# Patient Record
Sex: FEMALE | Race: WHITE | Employment: STUDENT | URBAN - METROPOLITAN AREA
[De-identification: names, ages, dates, MRNs, and addresses within clinical notes are randomized per-mention and may not be internally consistent; named-entity substitution may affect disease eponyms.]

---

## 2021-10-14 ENCOUNTER — VIRTUAL VISIT (OUTPATIENT)
Dept: FAMILY MEDICINE CLINIC | Age: 21
End: 2021-10-14
Payer: COMMERCIAL

## 2021-10-14 ENCOUNTER — HOSPITAL ENCOUNTER (OUTPATIENT)
Age: 21
Setting detail: SPECIMEN
Discharge: HOME | End: 2021-10-14
Payer: COMMERCIAL

## 2021-10-14 DIAGNOSIS — Z20.822 EXPOSURE TO COVID-19 VIRUS: Primary | ICD-10-CM

## 2021-10-14 DIAGNOSIS — Z20.822 EXPOSURE TO COVID-19 VIRUS: ICD-10-CM

## 2021-10-14 PROCEDURE — U0003 INFECTIOUS AGENT DETECTION BY NUCLEIC ACID (DNA OR RNA); SEVERE ACUTE RESPIRATORY SYNDROME CORONAVIRUS 2 (SARS-COV-2) (CORONAVIRUS DISEASE [COVID-19]), AMPLIFIED PROBE TECHNIQUE, MAKING USE OF HIGH THROUGHPUT TECHNOLOGIES AS DESCRIBED BY CMS-2020-01-R: HCPCS

## 2021-10-14 PROCEDURE — U0005 INFEC AGEN DETEC AMPLI PROBE: HCPCS

## 2021-10-14 PROCEDURE — 99202 OFFICE O/P NEW SF 15 MIN: CPT | Performed by: NURSE PRACTITIONER

## 2021-10-14 ASSESSMENT — ENCOUNTER SYMPTOMS
SHORTNESS OF BREATH: 0
SORE THROAT: 0
RHINORRHEA: 0
PHOTOPHOBIA: 0
ALLERGIC/IMMUNOLOGIC NEGATIVE: 1
EYES NEGATIVE: 1
NAUSEA: 0
WHEEZING: 0
VOMITING: 0
BACK PAIN: 0
ABDOMINAL PAIN: 0

## 2021-10-14 NOTE — PROGRESS NOTES
Elpidio Najera (:  2000) is a [address] y.o. female,New patient, here for evaluation of the following chief complaint(s): Other (covid exposure)        ASSESSMENT/PLAN:    1. Exposure to COVID-19 virus  Covid test pending:    Discussed the following with patient  Patient was advised to isolate at home for the next 10 days (or until you receive a negative Covid result) and restrict activities outside their home, except for medical care. Patient also encouraged to:    -Separate themselves from other people and animals in their home   -Avoid sharing personal items  -Wear a facemask or, if possible, separate yourself from others in the house   -Cover cough/sneezes   -Wash hands frequently   -Clean all \"high touch\" surfaces daily    Should symptoms become worse, call PCP prior to visit and inform them that you have or are suspected of having COVID-19. Call 911 for medical emergency for severe or worsening symptoms that cannot be managed at home. No follow-ups on file. SUBJECTIVE/OBJECTIVE:  Patient is a college and lives in a dorm and there is a positive Covid cases. Patient denies any symptoms. Patient is completely asymptomatic. Patient specifically denies fever, cough, shortness of breath, loss of taste or smell, nausea, vomiting, or diarrhea. Review of Systems   Constitutional: Negative for appetite change, fatigue, fever and unexpected weight change. HENT: Negative for congestion, rhinorrhea and sore throat. Eyes: Negative. Negative for photophobia and visual disturbance. Respiratory: Negative for shortness of breath and wheezing. Cardiovascular: Negative for chest pain. Gastrointestinal: Negative for abdominal pain, nausea and vomiting. Endocrine: Negative. Negative for polydipsia, polyphagia and polyuria. Genitourinary: Negative for difficulty urinating, dysuria and pelvic pain. Musculoskeletal: Negative for back pain. Skin: Negative. Negative for rash. Allergic/Immunologic: Negative. Neurological: Negative. Negative for dizziness, speech difficulty and weakness. Hematological: Negative. Psychiatric/Behavioral: Negative. Negative for behavioral problems and confusion. No flowsheet data found. Physical Exam  No PE due to virtual visit      On this date 10/14/2021 I have spent 20 minutes reviewing previous notes, test results and face to face (virtual) with the patient discussing the diagnosis and importance of compliance with the treatment plan as well as documenting on the day of the visit. Breanna Perez was evaluated through a synchronous (real-time) audio-video encounter. The patient (or guardian if applicable) is aware that this is a billable service. Verbal consent to proceed has been obtained within the past 12 months. The visit was conducted pursuant to the emergency declaration under the [address] [place] [address], [address] [place] [address] authority and the [employer] General Act. Patient identification was verified, and a caregiver was present when appropriate. The patient was located in a state where the provider was credentialed to provide care. An electronic signature was used to authenticate this note.     --Michael Quiroz, APRN - CNP

## 2021-10-16 LAB
SARS-COV-2: NOT DETECTED
SOURCE: NORMAL

## 2022-10-31 ENCOUNTER — OFFICE VISIT (OUTPATIENT)
Dept: ENDOCRINOLOGY | Age: 22
End: 2022-10-31
Payer: COMMERCIAL

## 2022-10-31 VITALS
HEIGHT: 65 IN | SYSTOLIC BLOOD PRESSURE: 95 MMHG | HEART RATE: 72 BPM | DIASTOLIC BLOOD PRESSURE: 66 MMHG | BODY MASS INDEX: 23.32 KG/M2 | WEIGHT: 140 LBS | OXYGEN SATURATION: 97 %

## 2022-10-31 DIAGNOSIS — R53.83 OTHER FATIGUE: ICD-10-CM

## 2022-10-31 DIAGNOSIS — R79.89 ELEVATED CORTISOL LEVEL: ICD-10-CM

## 2022-10-31 DIAGNOSIS — R53.83 OTHER FATIGUE: Primary | ICD-10-CM

## 2022-10-31 LAB
T4 FREE: 1.26 NG/DL (ref 0.84–1.68)
TSH SERPL DL<=0.05 MIU/L-ACNC: 1.64 UIU/ML (ref 0.44–3.86)

## 2022-10-31 PROCEDURE — 99203 OFFICE O/P NEW LOW 30 MIN: CPT | Performed by: INTERNAL MEDICINE

## 2022-10-31 RX ORDER — ETONOGESTREL AND ETHINYL ESTRADIOL 11.7; 2.7 MG/1; MG/1
1 INSERT, EXTENDED RELEASE VAGINAL SEE ADMIN INSTRUCTIONS
COMMUNITY

## 2022-10-31 NOTE — PROGRESS NOTES
10/31/2022    Assessment:       Diagnosis Orders   1. Other fatigue  T4, Free    TSH    Thyroid Peroxidase Antibody    Anti-Thyroglobulin Antibody      2.  Elevated cortisol level  Cortisol Total            PLAN:     Orders Placed This Encounter   Procedures    T4, Free     Standing Status:   Future     Number of Occurrences:   1     Standing Expiration Date:   10/31/2023    TSH     Standing Status:   Future     Number of Occurrences:   1     Standing Expiration Date:   10/31/2023    Cortisol Total     Standing Status:   Future     Number of Occurrences:   1     Standing Expiration Date:   10/31/2023     Order Specific Question:   8AM or 4PM?     Answer:   random    Thyroid Peroxidase Antibody     Standing Status:   Future     Number of Occurrences:   1     Standing Expiration Date:   10/31/2023    Anti-Thyroglobulin Antibody     Standing Status:   Future     Number of Occurrences:   1     Standing Expiration Date:   10/31/2023     Will get repeat thyroid function test thyroid antibodies and a cortisol level patient educated extensively that high cortisol due to stress/anxiety to consider seeing psychology psychiatrist if needed follow-up in 4 weeks more than 50% of 30-minute spent patient education counseling  If all labs are normal would consider sign off from endocrine  Subjective:     Chief Complaint   Patient presents with    New Patient    Fatigue     Vitals:    10/31/22 1406   BP: 95/66   Pulse: 72   SpO2: 97%   Weight: 140 lb (63.5 kg)   Height: 5' 5\" (1.651 m)     Wt Readings from Last 3 Encounters:   10/31/22 140 lb (63.5 kg)     BP Readings from Last 3 Encounters:   10/31/22 95/66     Patient self-referred here for fatigue does complain of some anxiety stressors has had labs done through [facility] which were reviewed including labs from [place] showing increased cortisol level done recently patient denies any facial plethora any stretch marks on her abdomen any significant weight gain hypoglycemia hypertension electrolytes have been normal  Has had evaluation done also in [place] reviewed labs to her results from laptop  Currently is a student in college and [employer] here    Fatigue  This is a new problem. The current episode started more than 1 year ago. The problem occurs intermittently. The problem has been waxing and waning. Associated symptoms include fatigue. Pertinent negatives include no anorexia, diaphoresis, headaches or weakness. The symptoms are aggravated by stress. She has tried nothing for the symptoms. Latest Reference Range & Units 10/31/22 14:42   CORTISOL 2.7 - 18.4 ug/dL 11.6   Cortisol Collection Info  UNK   TSH 0.440 - 3.860 uIU/mL 1.640   Thyroid Peroxidase (TPO) Abs 0.0 - 25.0 IU/mL <4.0   T4 Free 0.84 - 1.68 ng/dL 1.26   Thyroglobulin Ab 0.0 - 4.0 IU/mL <0.9   Thyroglobulin by LC-MS/MS, Serum/Plasma 1.3 - 34.1 ng/mL Not Applicable   THYROGLOBULIN, SERUM OR PLASMA 1.3 - 31.8 ng/mL 11.3       No past medical history on file. No past surgical history on file. Social History     Socioeconomic History    Marital status: Single     Spouse name: Not on file    Number of children: Not on file    Years of education: Not on file    Highest education level: Not on file   Occupational History    Not on file   Tobacco Use    Smoking status: Never    Smokeless tobacco: Never   Vaping Use    Vaping Use: Never used   Substance and Sexual Activity    Alcohol use: Not Currently    Drug use: Never    Sexual activity: Not on file   Other Topics Concern    Not on file   Social History Narrative    Not on file     Social Determinants of Health     Financial Resource Strain: Not on file   Food Insecurity: Not on file   Transportation Needs: Not on file   Physical Activity: Not on file   Stress: Not on file   Social Connections: Not on file   Intimate Partner Violence: Not on file   Housing Stability: Not on file     No family history on file.   No Known Allergies    Current Outpatient Medications:     Pediatric Multivitamins-Fl (MULTI VIT/FL PO), Take by mouth, Disp: , Rfl:     etonogestrel-ethinyl estradiol (NUVARING) 0.12-0.015 MG/24HR vaginal ring, Place 1 each vaginally See Admin Instructions, Disp: , Rfl:   No results found for: NA, K, CL, CO2, BUN, CREATININE, GLUCOSE, CALCIUM, PROT, LABALBU, BILITOT, ALKPHOS, AST, ALT, LABGLOM, GFRAA, AGRATIO, GLOB  No results found for: WBC, HGB, HCT, MCV, PLT  No results found for: LABA1C  No results found for: CHOLFAST, TRIGLYCFAST, HDL, LDLCALC, CHOL, TRIG  No results found for: TESTM  No results found for: TSH, TSHREFLEX, FT3, T4FREE  No results found for: TPOABS    Review of Systems   Constitutional:  Positive for fatigue. Negative for diaphoresis and unexpected weight change. Eyes: Negative. Cardiovascular: Negative. Gastrointestinal:  Negative for anorexia. Endocrine: Negative. Musculoskeletal: Negative. Skin: Negative. Neurological:  Negative for weakness and headaches. Psychiatric/Behavioral:  Positive for dysphoric mood. The patient is nervous/anxious. All other systems reviewed and are negative. Objective:   Physical Exam  Vitals reviewed. Constitutional:       General: She is not in acute distress. Appearance: Normal appearance. She is normal weight. HENT:      Head: Normocephalic and atraumatic. Right Ear: External ear normal.      Left Ear: External ear normal.      Nose: Nose normal.      Mouth/Throat:      Mouth: Mucous membranes are moist.   Eyes:      General: No scleral icterus. Right eye: No discharge. Left eye: No discharge. Extraocular Movements: Extraocular movements intact. Conjunctiva/sclera: Conjunctivae normal.   Cardiovascular:      Rate and Rhythm: Normal rate and regular rhythm. Heart sounds: Normal heart sounds. Pulmonary:      Effort: Pulmonary effort is normal.      Breath sounds: Normal breath sounds. No wheezing or rhonchi.    Abdominal: Palpations: Abdomen is soft. Musculoskeletal:         General: Normal range of motion. Cervical back: Normal range of motion and neck supple. Neurological:      General: No focal deficit present. Mental Status: She is alert and oriented to person, place, and time. Psychiatric:         Mood and Affect: Mood is anxious.          Behavior: Behavior normal.

## 2022-11-01 LAB
CORTISOL COLLECTION INFO: NORMAL
CORTISOL: 11.6 UG/DL (ref 2.7–18.4)
THYROGLOBULIN AB: <0.9 IU/ML (ref 0–4)
THYROGLOBULIN BY LC-MS/MS, SERUM/PLASMA: NORMAL NG/ML (ref 1.3–31.8)
THYROGLOBULIN, SERUM OR PLASMA: 11.3 NG/ML (ref 1.3–31.8)

## 2022-11-03 LAB — THYROID PEROXIDASE (TPO) ABS: <4 IU/ML (ref 0–25)

## 2022-11-06 ASSESSMENT — ENCOUNTER SYMPTOMS: EYES NEGATIVE: 1

## 2022-11-28 ENCOUNTER — OFFICE VISIT (OUTPATIENT)
Dept: ENDOCRINOLOGY | Age: 22
End: 2022-11-28
Payer: COMMERCIAL

## 2022-11-28 VITALS
OXYGEN SATURATION: 98 % | HEART RATE: 58 BPM | HEIGHT: 65 IN | SYSTOLIC BLOOD PRESSURE: 95 MMHG | BODY MASS INDEX: 23.49 KG/M2 | WEIGHT: 141 LBS | DIASTOLIC BLOOD PRESSURE: 66 MMHG

## 2022-11-28 DIAGNOSIS — R79.89 ELEVATED CORTISOL LEVEL: ICD-10-CM

## 2022-11-28 DIAGNOSIS — R53.83 OTHER FATIGUE: Primary | ICD-10-CM

## 2022-11-28 PROCEDURE — 99213 OFFICE O/P EST LOW 20 MIN: CPT | Performed by: INTERNAL MEDICINE

## 2022-11-28 NOTE — PROGRESS NOTES
11/28/2022    Assessment:       Diagnosis Orders   1. Other fatigue        2. Elevated cortisol level            PLAN:         Results discussed with patient follow-up only as needed      Subjective:     Chief Complaint   Patient presents with    Other     Elevated cortisol level ,lab results     Vitals:    11/28/22 1321   BP: 95/66   Pulse: 58   SpO2: 98%   Weight: 141 lb (64 kg)   Height: 5' 5\" (1.651 m)     Wt Readings from Last 3 Encounters:   11/28/22 141 lb (64 kg)   10/31/22 140 lb (63.5 kg)     BP Readings from Last 3 Encounters:   11/28/22 95/66   10/31/22 95/66     Follow-up on fatigue history of increased cortisol level repeat cortisol thyroid functions and thyroid antibodies were all negative patient still has issues with anxiety wants to follow-up with psychologist    Fatigue  This is a chronic problem. The current episode started more than 1 year ago. The problem occurs intermittently. The problem has been waxing and waning. Associated symptoms include fatigue. Pertinent negatives include no anorexia. The symptoms are aggravated by stress. She has tried nothing for the symptoms. Latest Reference Range & Units 10/31/22 14:42   CORTISOL 2.7 - 18.4 ug/dL 11.6   Cortisol Collection Info  UNK   TSH 0.440 - 3.860 uIU/mL 1.640   Thyroid Peroxidase (TPO) Abs 0.0 - 25.0 IU/mL <4.0   T4 Free 0.84 - 1.68 ng/dL 1.26   Thyroglobulin Ab 0.0 - 4.0 IU/mL <0.9   Thyroglobulin by LC-MS/MS, Serum/Plasma 1.3 - 69.9 ng/mL Not Applicable   THYROGLOBULIN, SERUM OR PLASMA 1.3 - 31.8 ng/mL 11.3       No past medical history on file. No past surgical history on file.   Social History     Socioeconomic History    Marital status: Single     Spouse name: Not on file    Number of children: Not on file    Years of education: Not on file    Highest education level: Not on file   Occupational History    Not on file   Tobacco Use    Smoking status: Never    Smokeless tobacco: Never   Vaping Use    Vaping Use: Never used Substance and Sexual Activity    Alcohol use: Not Currently    Drug use: Never    Sexual activity: Not on file   Other Topics Concern    Not on file   Social History Narrative    Not on file     Social Determinants of Health     Financial Resource Strain: Not on file   Food Insecurity: Not on file   Transportation Needs: Not on file   Physical Activity: Not on file   Stress: Not on file   Social Connections: Not on file   Intimate Partner Violence: Not on file   Housing Stability: Not on file     No family history on file. No Known Allergies    Current Outpatient Medications:     Pediatric Multivitamins-Fl (MULTI VIT/FL PO), Take by mouth, Disp: , Rfl:     etonogestrel-ethinyl estradiol (NUVARING) 0.12-0.015 MG/24HR vaginal ring, Place 1 each vaginally See Admin Instructions, Disp: , Rfl:   No results found for: NA, K, CL, CO2, BUN, CREATININE, GLUCOSE, CALCIUM, PROT, LABALBU, BILITOT, ALKPHOS, AST, ALT, LABGLOM, GFRAA, AGRATIO, GLOB  No results found for: WBC, HGB, HCT, MCV, PLT  No results found for: LABA1C  No results found for: CHOLFAST, TRIGLYCFAST, HDL, LDLCALC, CHOL, TRIG  No results found for: TESTM  Lab Results   Component Value Date    TSH 1.640 10/31/2022    T4FREE 1.26 10/31/2022     Lab Results   Component Value Date    TPOABS <4.0 10/31/2022       Review of Systems   Constitutional:  Positive for fatigue. Gastrointestinal:  Negative for anorexia. Objective:   Physical Exam  Vitals reviewed. Constitutional:       General: She is not in acute distress. Appearance: Normal appearance. HENT:      Head: Normocephalic and atraumatic. Right Ear: External ear normal.      Left Ear: External ear normal.      Nose: Nose normal.   Eyes:      General: No scleral icterus. Right eye: No discharge. Left eye: No discharge. Extraocular Movements: Extraocular movements intact. Conjunctiva/sclera: Conjunctivae normal.   Cardiovascular:      Rate and Rhythm: Normal rate. Pulmonary:      Effort: Pulmonary effort is normal.   Musculoskeletal:         General: Normal range of motion. Cervical back: Normal range of motion and neck supple. Neurological:      General: No focal deficit present. Mental Status: She is alert and oriented to person, place, and time. Psychiatric:         Mood and Affect: Mood is anxious.          Behavior: Behavior normal.

## 2023-02-03 ENCOUNTER — HOSPITAL ENCOUNTER (OUTPATIENT)
Dept: LAB | Age: 23
Discharge: HOME | End: 2023-02-03
Payer: COMMERCIAL

## 2023-02-03 ENCOUNTER — OFFICE VISIT (OUTPATIENT)
Dept: FAMILY MEDICINE CLINIC | Age: 23
End: 2023-02-03
Payer: COMMERCIAL

## 2023-02-03 VITALS
OXYGEN SATURATION: 99 % | TEMPERATURE: 97.4 F | SYSTOLIC BLOOD PRESSURE: 94 MMHG | DIASTOLIC BLOOD PRESSURE: 66 MMHG | WEIGHT: 144.8 LBS | HEART RATE: 57 BPM | HEIGHT: 65 IN | BODY MASS INDEX: 24.12 KG/M2

## 2023-02-03 DIAGNOSIS — R53.82 CHRONIC FATIGUE: ICD-10-CM

## 2023-02-03 DIAGNOSIS — R53.82 CHRONIC FATIGUE: Primary | ICD-10-CM

## 2023-02-03 DIAGNOSIS — G47.30 SLEEP-DISORDERED BREATHING: ICD-10-CM

## 2023-02-03 LAB
ALBUMIN SERPL-MCNC: 4.3 G/DL (ref 3.5–4.6)
ALP BLD-CCNC: 70 U/L (ref 40–130)
ALT SERPL-CCNC: 16 U/L (ref 0–33)
ANION GAP SERPL CALCULATED.3IONS-SCNC: 8 MEQ/L (ref 9–15)
AST SERPL-CCNC: 23 U/L (ref 0–35)
BASOPHILS ABSOLUTE: 0 K/UL (ref 0–0.2)
BASOPHILS RELATIVE PERCENT: 0.7 %
BILIRUB SERPL-MCNC: <0.2 MG/DL (ref 0.2–0.7)
BUN BLDV-MCNC: 11 MG/DL (ref 6–20)
CALCIUM SERPL-MCNC: 9.4 MG/DL (ref 8.5–9.9)
CHLORIDE BLD-SCNC: 104 MEQ/L (ref 95–107)
CO2: 27 MEQ/L (ref 20–31)
CREAT SERPL-MCNC: 0.72 MG/DL (ref 0.5–0.9)
EOSINOPHILS ABSOLUTE: 0.1 K/UL (ref 0–0.7)
EOSINOPHILS RELATIVE PERCENT: 2.2 %
GFR SERPL CREATININE-BSD FRML MDRD: >60 ML/MIN/{1.73_M2}
GLOBULIN: 3 G/DL (ref 2.3–3.5)
GLUCOSE BLD-MCNC: 84 MG/DL (ref 70–99)
HCT VFR BLD CALC: 39.1 % (ref 37–47)
HEMOGLOBIN: 13.1 G/DL (ref 12–16)
LYMPHOCYTES ABSOLUTE: 2.3 K/UL (ref 1–4.8)
LYMPHOCYTES RELATIVE PERCENT: 38.2 %
MCH RBC QN AUTO: 30.4 PG (ref 27–31.3)
MCHC RBC AUTO-ENTMCNC: 33.5 % (ref 33–37)
MCV RBC AUTO: 90.6 FL (ref 79.4–94.8)
MONOCYTES ABSOLUTE: 0.4 K/UL (ref 0.2–0.8)
MONOCYTES RELATIVE PERCENT: 6.9 %
NEUTROPHILS ABSOLUTE: 3.2 K/UL (ref 1.4–6.5)
NEUTROPHILS RELATIVE PERCENT: 52 %
PDW BLD-RTO: 13 % (ref 11.5–14.5)
PLATELET # BLD: 397 K/UL (ref 130–400)
POTASSIUM SERPL-SCNC: 4.1 MEQ/L (ref 3.4–4.9)
RBC # BLD: 4.32 M/UL (ref 4.2–5.4)
SODIUM BLD-SCNC: 139 MEQ/L (ref 135–144)
TOTAL PROTEIN: 7.3 G/DL (ref 6.3–8)
WBC # BLD: 6.1 K/UL (ref 4.8–10.8)

## 2023-02-03 PROCEDURE — 83540 ASSAY OF IRON: CPT

## 2023-02-03 PROCEDURE — 99204 OFFICE O/P NEW MOD 45 MIN: CPT | Performed by: FAMILY MEDICINE

## 2023-02-03 PROCEDURE — 80053 COMPREHEN METABOLIC PANEL: CPT

## 2023-02-03 PROCEDURE — 82607 VITAMIN B-12: CPT

## 2023-02-03 PROCEDURE — 82728 ASSAY OF FERRITIN: CPT

## 2023-02-03 PROCEDURE — 85025 COMPLETE CBC W/AUTO DIFF WBC: CPT

## 2023-02-03 PROCEDURE — 83550 IRON BINDING TEST: CPT

## 2023-02-03 PROCEDURE — 86038 ANTINUCLEAR ANTIBODIES: CPT

## 2023-02-03 PROCEDURE — 36415 COLL VENOUS BLD VENIPUNCTURE: CPT

## 2023-02-03 PROCEDURE — 82746 ASSAY OF FOLIC ACID SERUM: CPT

## 2023-02-03 SDOH — ECONOMIC STABILITY: INCOME INSECURITY: HOW HARD IS IT FOR YOU TO PAY FOR THE VERY BASICS LIKE FOOD, HOUSING, MEDICAL CARE, AND HEATING?: NOT HARD AT ALL

## 2023-02-03 SDOH — ECONOMIC STABILITY: FOOD INSECURITY: WITHIN THE PAST 12 MONTHS, THE FOOD YOU BOUGHT JUST DIDN'T LAST AND YOU DIDN'T HAVE MONEY TO GET MORE.: NEVER TRUE

## 2023-02-03 SDOH — ECONOMIC STABILITY: HOUSING INSECURITY
IN THE LAST 12 MONTHS, WAS THERE A TIME WHEN YOU DID NOT HAVE A STEADY PLACE TO SLEEP OR SLEPT IN A SHELTER (INCLUDING NOW)?: NO

## 2023-02-03 SDOH — ECONOMIC STABILITY: FOOD INSECURITY: WITHIN THE PAST 12 MONTHS, YOU WORRIED THAT YOUR FOOD WOULD RUN OUT BEFORE YOU GOT MONEY TO BUY MORE.: NEVER TRUE

## 2023-02-03 ASSESSMENT — PATIENT HEALTH QUESTIONNAIRE - PHQ9
SUM OF ALL RESPONSES TO PHQ9 QUESTIONS 1 & 2: 0
4. FEELING TIRED OR HAVING LITTLE ENERGY: 0
SUM OF ALL RESPONSES TO PHQ QUESTIONS 1-9: 0
9. THOUGHTS THAT YOU WOULD BE BETTER OFF DEAD, OR OF HURTING YOURSELF: 0
6. FEELING BAD ABOUT YOURSELF - OR THAT YOU ARE A FAILURE OR HAVE LET YOURSELF OR YOUR FAMILY DOWN: 0
SUM OF ALL RESPONSES TO PHQ QUESTIONS 1-9: 0
3. TROUBLE FALLING OR STAYING ASLEEP: 0
10. IF YOU CHECKED OFF ANY PROBLEMS, HOW DIFFICULT HAVE THESE PROBLEMS MADE IT FOR YOU TO DO YOUR WORK, TAKE CARE OF THINGS AT HOME, OR GET ALONG WITH OTHER PEOPLE: 0
SUM OF ALL RESPONSES TO PHQ QUESTIONS 1-9: 0
SUM OF ALL RESPONSES TO PHQ QUESTIONS 1-9: 0
1. LITTLE INTEREST OR PLEASURE IN DOING THINGS: 0
8. MOVING OR SPEAKING SO SLOWLY THAT OTHER PEOPLE COULD HAVE NOTICED. OR THE OPPOSITE, BEING SO FIGETY OR RESTLESS THAT YOU HAVE BEEN MOVING AROUND A LOT MORE THAN USUAL: 0
5. POOR APPETITE OR OVEREATING: 0
7. TROUBLE CONCENTRATING ON THINGS, SUCH AS READING THE NEWSPAPER OR WATCHING TELEVISION: 0
2. FEELING DOWN, DEPRESSED OR HOPELESS: 0

## 2023-02-04 ASSESSMENT — ENCOUNTER SYMPTOMS
ABDOMINAL PAIN: 0
DIARRHEA: 0
VOMITING: 0
APNEA: 0
CHEST TIGHTNESS: 0
SHORTNESS OF BREATH: 0
COUGH: 0
NAUSEA: 0
BLOOD IN STOOL: 0
CONSTIPATION: 0

## 2023-02-05 LAB
FERRITIN: 25 NG/ML (ref 13–150)
FOLATE: 16 NG/ML
IRON SATURATION: 35 % (ref 20–55)
IRON: 146 UG/DL (ref 37–145)
TOTAL IRON BINDING CAPACITY: 422 UG/DL (ref 250–450)
UNSATURATED IRON BINDING CAPACITY: 276 UG/DL (ref 112–347)
VITAMIN B-12: 313 PG/ML (ref 232–1245)

## 2023-02-05 NOTE — PROGRESS NOTES
Subjective:      Patient ID: Brisa Boyce is a 25 y.o. female who presents today for:     Chief Complaint   Patient presents with    New Patient     Pt would like to discuss fatigue and labs pt would like a sleep study, discuss a swollen tonsil R side, states food gets stuck behind it. Discuss bradycardia while sleeping. HPI  Brisa Boyce very pleasant 67-year-old female presents today to establish care. She has been dealing with chronic fatigue for over 4 years. She has had previous work-up in [place] and has a history of mild iron deficiency anemia. She states that she has had thyroid testing which was normal.  SHe denies vitamin deficiencies however patient does suffer from well managed binge eating disorder. She is concerned about poor sleep habits. She states that she is able to get 8 hours of sleep however still does not feel rested. Her father has a history of sleep apnea. She denies any heavy snoring or witnessed apnea but does endorse daytime fatigue and morning headaches. She had a cardiac work-up when she was 9years old which showed no abnormalities  History reviewed. No pertinent past medical history. History reviewed. No pertinent surgical history.   Family History   Problem Relation Age of Onset    Depression Father     High Cholesterol Father     Sleep Apnea Father     ADHD Sister      Social History     Socioeconomic History    Marital status: Single     Spouse name: Not on file    Number of children: Not on file    Years of education: Not on file    Highest education level: Not on file   Occupational History    Not on file   Tobacco Use    Smoking status: Never    Smokeless tobacco: Never   Vaping Use    Vaping Use: Never used   Substance and Sexual Activity    Alcohol use: Not Currently    Drug use: Never    Sexual activity: Not on file   Other Topics Concern    Not on file   Social History Narrative    Not on file     Social Determinants of Health Financial Resource Strain: Low Risk     Difficulty of Paying Living Expenses: Not hard at all   Food Insecurity: No Food Insecurity    Worried About [address] in the Last Year: Never true    Ran Out of Food in the Last Year: Never true   Transportation Needs: Unknown    Lack of Transportation (Medical): Not on file    Lack of Transportation (Non-Medical): No   Physical Activity: Not on file   Stress: Not on file   Social Connections: Not on file   Intimate Partner Violence: Not on file   Housing Stability: Unknown    Unable to Pay for Housing in the Last Year: Not on file    Number of Places Lived in the Last Year: Not on file    Unstable Housing in the Last Year: No     Current Outpatient Medications on File Prior to Visit   Medication Sig Dispense Refill    Pediatric Multivitamins-Fl (MULTI VIT/FL PO) Take by mouth      etonogestrel-ethinyl estradiol (NUVARING) 0.12-0.015 MG/24HR vaginal ring Place 1 each vaginally See Admin Instructions       No current facility-administered medications on file prior to visit. Allergies:  Patient has no known allergies. Review of Systems   Constitutional:  Positive for fatigue. Negative for activity change, appetite change and unexpected weight change. Respiratory:  Negative for apnea, cough, chest tightness and shortness of breath. Cardiovascular:  Negative for chest pain, palpitations and leg swelling. Gastrointestinal:  Negative for abdominal pain, blood in stool, constipation, diarrhea, nausea and vomiting. Musculoskeletal:  Negative for arthralgias. Neurological:  Negative for seizures and headaches. Psychiatric/Behavioral:  Negative for hallucinations and suicidal ideas. Objective:   BP 94/66   Pulse 57   Temp 97.4 °F (36.3 °C) (Infrared)   Ht 5' 5\" (1.651 m)   Wt 144 lb 12.8 oz (65.7 kg)   SpO2 99%   BMI 24.10 kg/m²     Physical Exam  Vitals and nursing note reviewed.    Constitutional:       General: She is not in acute distress. Appearance: Normal appearance. She is well-developed. She is not diaphoretic. HENT:      Head: Normocephalic and atraumatic. Nose: Nose normal.      Mouth/Throat:      Mouth: Mucous membranes are moist.      Pharynx: Oropharynx is clear. Eyes:      Conjunctiva/sclera: Conjunctivae normal.      Pupils: Pupils are equal, round, and reactive to light. Cardiovascular:      Rate and Rhythm: Normal rate and regular rhythm. Heart sounds: Normal heart sounds. No murmur heard. No friction rub. No gallop. Pulmonary:      Effort: Pulmonary effort is normal. No respiratory distress. Breath sounds: Normal breath sounds. No wheezing or rales. Chest:      Chest wall: No tenderness. Abdominal:      General: Abdomen is flat. Bowel sounds are normal.      Palpations: Abdomen is soft. Tenderness: There is no abdominal tenderness. Musculoskeletal:      Cervical back: Normal range of motion. Skin:     General: Skin is warm and dry. Neurological:      Mental Status: She is alert and oriented to person, place, and time. Psychiatric:         Behavior: Behavior normal.         Thought Content: Thought content normal.         Judgment: Judgment normal.       Assessment & Plan:     1. Chronic fatigue  UnClear etiology at this time. Will investigate further  - [facility] Sleep Study with PAP Titration; Future  - CBC with Auto Differential; Future  - Comprehensive Metabolic Panel; Future  - Vitamin B12 & Folate; Future  - Iron and TIBC; Future  - Ferritin; Future  - MARGIE Screen With Reflex; Future    2. Sleep-disordered breathing  Believe patient would qualify for a sleep study given history of daytime fatigue, family history and morning headaches as well as nonrestful sleep  - [facility] Sleep Study with PAP Titration; Future      Return in about 1 month (around 3/3/2023).     Mabel Vázquez MD

## 2023-02-06 ENCOUNTER — TELEPHONE (OUTPATIENT)
Dept: FAMILY MEDICINE CLINIC | Age: 23
End: 2023-02-06

## 2023-02-06 NOTE — TELEPHONE ENCOUNTER
Patricia Toro took an autoimmune test- results show detected- what does this mean ?  Please call   [facility]  2/13/23

## 2023-02-07 NOTE — TELEPHONE ENCOUNTER
We are still waiting  on the reflex/confirmatory testing to be completed in order to determine exactly what this test result means

## 2023-02-13 ENCOUNTER — OFFICE VISIT (OUTPATIENT)
Dept: FAMILY MEDICINE CLINIC | Age: 23
End: 2023-02-13
Payer: COMMERCIAL

## 2023-02-13 VITALS
OXYGEN SATURATION: 97 % | TEMPERATURE: 98 F | HEART RATE: 51 BPM | DIASTOLIC BLOOD PRESSURE: 60 MMHG | BODY MASS INDEX: 24.1 KG/M2 | WEIGHT: 144.8 LBS | SYSTOLIC BLOOD PRESSURE: 94 MMHG

## 2023-02-13 DIAGNOSIS — G47.30 SLEEP-DISORDERED BREATHING: ICD-10-CM

## 2023-02-13 DIAGNOSIS — R00.1 BRADYCARDIA: Primary | ICD-10-CM

## 2023-02-13 DIAGNOSIS — R53.82 CHRONIC FATIGUE: ICD-10-CM

## 2023-02-13 DIAGNOSIS — R76.8 POSITIVE ANA (ANTINUCLEAR ANTIBODY): ICD-10-CM

## 2023-02-13 PROCEDURE — 93000 ELECTROCARDIOGRAM COMPLETE: CPT | Performed by: FAMILY MEDICINE

## 2023-02-13 PROCEDURE — 99214 OFFICE O/P EST MOD 30 MIN: CPT | Performed by: FAMILY MEDICINE

## 2023-02-13 NOTE — PROGRESS NOTES
Subjective:      Patient ID: María Elena Harrison is a 25 y.o. female who presents today for:     Chief Complaint   Patient presents with    Discuss Labs       HPI  Patient is a very pleasant 27-year-old female presents today to follow-up. She was recently seen to establish care with complaints of significant fatigue. She has been evaluated by endocrinology with out a definitive diagnosis. Recent lab work showed no significant abnormality other than a positive MARGIE, although the titer levels at 1-80 were negative. Therefore no reflex order was obtained. Patient states that fatigue has not changed. Upon further questioning, patient does admit to episodic lightheadedness but denies any palpitations. She reports that her smart watch will record heart rate levels in the low 40s intermittently and usually at night. She denies any family history of early cardiac disease    History reviewed. No pertinent past medical history. History reviewed. No pertinent surgical history.   Family History   Problem Relation Age of Onset    Depression Father     High Cholesterol Father     Sleep Apnea Father     ADHD Sister      Social History     Socioeconomic History    Marital status: Single     Spouse name: Not on file    Number of children: Not on file    Years of education: Not on file    Highest education level: Not on file   Occupational History    Not on file   Tobacco Use    Smoking status: Never    Smokeless tobacco: Never   Vaping Use    Vaping Use: Never used   Substance and Sexual Activity    Alcohol use: Not Currently    Drug use: Never    Sexual activity: Not on file   Other Topics Concern    Not on file   Social History Narrative    Not on file     Social Determinants of Health     Financial Resource Strain: Low Risk     Difficulty of Paying Living Expenses: Not hard at all   Food Insecurity: No Food Insecurity    Worried About [employer] [employer] in the Last Year: Never true    [address] in the Last Year: Never true   Transportation Needs: Unknown    Lack of Transportation (Medical): Not on file    Lack of Transportation (Non-Medical): No   Physical Activity: Not on file   Stress: Not on file   Social Connections: Not on file   Intimate Partner Violence: Not on file   Housing Stability: Unknown    Unable to Pay for Housing in the Last Year: Not on file    Number of Places Lived in the Last Year: Not on file    Unstable Housing in the Last Year: No     Current Outpatient Medications on File Prior to Visit   Medication Sig Dispense Refill    Pediatric Multivitamins-Fl (MULTI VIT/FL PO) Take by mouth      etonogestrel-ethinyl estradiol (NUVARING) 0.12-0.015 MG/24HR vaginal ring Place 1 each vaginally See Admin Instructions       No current facility-administered medications on file prior to visit. Allergies:  Patient has no known allergies. Review of Systems   Constitutional:  Positive for fatigue. Negative for activity change, appetite change, fever and unexpected weight change. Respiratory:  Negative for apnea, cough, chest tightness and shortness of breath. Cardiovascular:  Negative for chest pain, palpitations and leg swelling. Gastrointestinal:  Negative for abdominal pain, blood in stool, constipation, diarrhea, nausea and vomiting. Musculoskeletal:  Negative for arthralgias. Neurological:  Negative for seizures and headaches. Psychiatric/Behavioral:  Negative for hallucinations and suicidal ideas. Objective:   BP 94/60   Pulse 51   Temp 98 °F (36.7 °C) (Infrared)   Wt 144 lb 12.8 oz (65.7 kg)   SpO2 97%   BMI 24.10 kg/m²     Physical Exam  Vitals and nursing note reviewed. Constitutional:       General: She is not in acute distress. Appearance: Normal appearance. She is well-developed. She is not diaphoretic. HENT:      Head: Normocephalic and atraumatic. Nose: Nose normal.      Mouth/Throat:      Mouth: Mucous membranes are moist.      Pharynx: Oropharynx is clear. Eyes:      Conjunctiva/sclera: Conjunctivae normal.      Pupils: Pupils are equal, round, and reactive to light. Cardiovascular:      Rate and Rhythm: Normal rate and regular rhythm. Heart sounds: Normal heart sounds. No murmur heard. No friction rub. No gallop. Pulmonary:      Effort: Pulmonary effort is normal. No respiratory distress. Breath sounds: Normal breath sounds. No wheezing or rales. Chest:      Chest wall: No tenderness. Abdominal:      General: Abdomen is flat. Bowel sounds are normal.      Palpations: Abdomen is soft. Tenderness: There is no abdominal tenderness. Musculoskeletal:      Cervical back: Normal range of motion. Skin:     General: Skin is warm and dry. Neurological:      Mental Status: She is alert and oriented to person, place, and time. Psychiatric:         Behavior: Behavior normal.         Thought Content: Thought content normal.         Judgment: Judgment normal.       Assessment & Plan:     1. Bradycardia  May be attributed to patient's prior history of being a well-trained runner however will need further investigation especially in the context of chronic fatigue  - [facility] [id number] [facility], Ermias Dillard DO, Interventional Cardiology, [place]    2. Sleep-disordered breathing  Follow-up upcoming sleep study    3. Chronic fatigue  Unclear etiology at this time. Follow-up with endocrinology and further investigations. Will refer to rheumatology due to positive MARGIE, since reflex testing not performed    Return in about 3 months (around 5/13/2023), or if symptoms worsen or fail to improve.     Marcy Laureano MD

## 2023-02-18 ASSESSMENT — ENCOUNTER SYMPTOMS
CHEST TIGHTNESS: 0
COUGH: 0
BLOOD IN STOOL: 0
VOMITING: 0
ABDOMINAL PAIN: 0
DIARRHEA: 0
CONSTIPATION: 0
NAUSEA: 0
SHORTNESS OF BREATH: 0
APNEA: 0

## 2023-03-02 ENCOUNTER — TELEPHONE (OUTPATIENT)
Dept: FAMILY MEDICINE CLINIC | Age: 23
End: 2023-03-02

## 2023-03-27 ENCOUNTER — TELEPHONE (OUTPATIENT)
Dept: FAMILY MEDICINE CLINIC | Age: 23
End: 2023-03-27

## 2023-03-27 NOTE — TELEPHONE ENCOUNTER
Advise patient that without more information I cannot change the diagnosis at this time. Dr. Efrain Delatorre note says the patient denied snoring or episodes of apnea.   It would be best to wait till he is back in the office for him to address this as it is not clear what diagnosis would be most appropriate

## 2023-03-28 ENCOUNTER — OFFICE VISIT (OUTPATIENT)
Dept: CARDIOLOGY CLINIC | Age: 23
End: 2023-03-28
Payer: COMMERCIAL

## 2023-03-28 VITALS
WEIGHT: 144.6 LBS | DIASTOLIC BLOOD PRESSURE: 64 MMHG | HEIGHT: 65 IN | SYSTOLIC BLOOD PRESSURE: 94 MMHG | BODY MASS INDEX: 24.09 KG/M2 | HEART RATE: 56 BPM | OXYGEN SATURATION: 97 %

## 2023-03-28 DIAGNOSIS — R00.1 BRADYCARDIA: Primary | ICD-10-CM

## 2023-03-28 PROCEDURE — 99204 OFFICE O/P NEW MOD 45 MIN: CPT | Performed by: INTERNAL MEDICINE

## 2023-03-28 PROCEDURE — 93000 ELECTROCARDIOGRAM COMPLETE: CPT | Performed by: INTERNAL MEDICINE

## 2023-03-28 NOTE — PROGRESS NOTES
3/28/2023    MD Krystal Carballo [address]    RE: Jillian Key   : 2000     Dear Dr. Puma Avila MD :    I had the pleasure of seeing your patient, Jillian Key in the office today on 3/28/2023. As you are well aware, Ms. Salas is a pleasant 25 y.o.  female who was kindly referred to me for evaluation of bradycardia. Currently, she reports profound fatigue over the last for 5 years which has worsened in the last 2 months. She has noticed episodes of bradycardia, mostly during nocturnal/sleeping hours on her smart watch. Her heart rate will range between 30 to 40 bpm at times. She has not noticed any significant bradycardia during the day her heart rates usually run 50s to 60s during the day. 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 She denies any limiting exertional dyspnea with her activities. No chest pain. No significant palpitations. No near-syncope or syncope. Past Medical History: Anxiety and hyperlipidemia. Surgical History: She  has no past surgical history on file. Social History: She does not smoke. She reports rare alcohol use. No illicit drugs reported. No significant caffeine intake. Family History: Father with history of hyperlipidemia and maternal grandmother with history of CVA. Current medications:   Current Outpatient Medications   Medication Sig Dispense Refill    Pediatric Multivitamins-Fl (MULTI VIT/FL PO) Take by mouth       No current facility-administered medications for this visit. Allergies: Patient has no known allergies. Review of Systems   General: Fatigued.  + Frequent daytime somnolence.  + Trouble concentrating. Cardiovascular: See HPI. No orthopnea or PND. Respiratory: Dyspnea is present with extreme degrees of activity only, non limiting. Awaiting sleep study.   Gastrointestinal: No melena or

## 2023-04-03 ENCOUNTER — TELEPHONE (OUTPATIENT)
Dept: CARDIOLOGY CLINIC | Age: 23
End: 2023-04-03

## 2023-04-03 NOTE — TELEPHONE ENCOUNTER
[facility] calling about pt's monitor. Pt wore monitor for one day when it fell off. Pt called [facility] and was unsuccessful in getting it back on, she was advised to send it to [facility]. [facility] has not received monitor yet. They wanted to know if you wanted to send out another monitor?  She will not be charged for the first monitor if another is sent out

## 2023-04-24 ENCOUNTER — HOSPITAL ENCOUNTER (OUTPATIENT)
Dept: SLEEP CENTER | Age: 23
Discharge: HOME | End: 2023-04-26
Payer: COMMERCIAL

## 2023-04-24 PROCEDURE — 95810 POLYSOM 6/> YRS 4/> PARAM: CPT

## 2023-04-26 ENCOUNTER — OFFICE VISIT (OUTPATIENT)
Dept: CARDIOLOGY CLINIC | Age: 23
End: 2023-04-26
Payer: COMMERCIAL

## 2023-04-26 VITALS
SYSTOLIC BLOOD PRESSURE: 100 MMHG | OXYGEN SATURATION: 99 % | WEIGHT: 144 LBS | HEIGHT: 65 IN | DIASTOLIC BLOOD PRESSURE: 74 MMHG | HEART RATE: 66 BPM | BODY MASS INDEX: 23.99 KG/M2

## 2023-04-26 DIAGNOSIS — R53.83 OTHER FATIGUE: Primary | ICD-10-CM

## 2023-04-26 DIAGNOSIS — R00.1 BRADYCARDIA: ICD-10-CM

## 2023-04-26 PROCEDURE — 99213 OFFICE O/P EST LOW 20 MIN: CPT | Performed by: INTERNAL MEDICINE

## 2023-04-26 NOTE — PROGRESS NOTES
bruising or bleeding. History of mild iron deficiency anemia. Vascular: No lower extremity edema or claudication. Neurological: No TIA or CVA symptoms. + Paresthesias. Musculoskeletal: No chest wall pain. Psychiatric: + Anxiety. *All other systems were reviewed and found to be negative unless otherwise noted in the HPI*    Physical Examination: Her height is 5' 5\" (1.651 m) and weight is 144 lb (65.3 kg). Her blood pressure is 100/74 and her pulse is 66. Her oxygen saturation is 99%. She is alert and oriented to person, place, and time. No distress. Head is atraumatic. Moist mucous membranes. Neck is supple without JVD or carotid bruits. No thyroidmegaly. Lungs are clear to auscultation both anteriorly and posteriorly. No accessory muscle usage. Heart is a regular rate and rhythm. No murmurs. No S3 or S4. PMI is nondisplaced. Abdomen is soft and non tender. No hepatosplenomegaly. No abdominal aortic bruits or masses. Lower extremities are free of edema. No clubbing or cyanosis. Neurologically, cranial nerves are grossly intact. No focal sensory and/or motor deficits. Skin is intact without rash or lesions. ECG (4/26/2023): None today. Prior office ECG: Sinus bradycardia. Heart rate 58 bpm.  No evidence of ventricular preexcitation. Nonspecific T wave changes. Recent thyroid studies normal.    2-week O event monitor reveals normal sinus rhythm average heart rate of 69 bpm.  No pathologic bradycardia arrhythmias AV blocks, and/or pauses. No pathologic tachyarrhythmias. Rare PACs and PVCs. IMPRESSION:  Bradycardia, likely vagal mediated. Fatigue, doubt related to occasional bradycardia. History of anxiety  History of hyperlipidemia  Possible MAGDA, awaiting sleep study results      RECOMMENDATIONS:  Ms. Robi Abernathy appears to be stable from cardiac standpoint. Her event monitor does not show any pathologic arrhythmias, AV block, and/or pauses.   She had rare bradycardia but only

## 2023-05-04 DIAGNOSIS — R53.82 CHRONIC FATIGUE: ICD-10-CM

## 2023-05-04 DIAGNOSIS — G47.30 SLEEP-DISORDERED BREATHING: ICD-10-CM

## 2023-05-06 DIAGNOSIS — G47.9 SLEEP DISORDER: Primary | ICD-10-CM

## 2023-05-26 PROBLEM — G47.30 SLEEP-DISORDERED BREATHING: Status: ACTIVE | Noted: 2023-05-26
